# Patient Record
(demographics unavailable — no encounter records)

---

## 2017-01-22 NOTE — ER DOCUMENT REPORT
ED Medical Screen (RME)





- General


Stated Complaint: HIP PAIN


Mode of Arrival: Ambulatory


Information source: Patient


Notes: 


60 y/o F presents to ED c/o right hip and left knee pain. Reports hx of similar 

symptoms in the past. Denies fever or recent injury. 





I have greeted and performed a rapid initial assessment of this patient. A 

comprehensive ED assessment and evaluation of the patient, analysis of test 

results and completion of the medical decision making process will be conducted 

by additional ED providers.


TRAVEL OUTSIDE OF THE [place] IN LAST 30 DAYS: No





- Related Data


Allergies/Adverse Reactions: 


 





acetaminophen [From Percocet] Allergy (Severe, Verified 09/18/16 11:44)


 itching


oxycodone HCl [From Percocet] Allergy (Severe, Verified 09/18/16 11:44)


 itching


Penicillins Allergy (Severe, Verified 09/18/16 11:44)


 Generalized Itching


orange juice Allergy (Severe, Uncoded 09/18/16 11:44)


 Generalized Itching











Past Medical History





- Past Medical History


Cardiac Medical History: Reports: Hx Hypertension


Pulmonary Medical History: Reports: Hx Asthma


Musculoskeltal Medical History: Reports Hx Arthritis


Past Surgical History: Reports: Hx Oral Surgery, Hx Tubal Ligation





- Immunizations


Hx Diphtheria, Pertussis, Tetanus Vaccination: Yes





Physical Exam





- Vital signs


Vitals: 





 











Temp Pulse Resp BP Pulse Ox


 


 98.6 F   93   21 H  109/61   97 


 


 01/22/17 12:00  01/22/17 12:00  01/22/17 12:00  01/22/17 12:00  01/22/17 12:00














- General


General appearance: Appears well, Alert


In distress: None





Course





- Vital Signs


Vital signs: 





 











Temp Pulse Resp BP Pulse Ox


 


 98.6 F   93   21 H  109/61   97 


 


 01/22/17 12:00  01/22/17 12:00  01/22/17 12:00  01/22/17 12:00  01/22/17 12:00

## 2017-01-22 NOTE — ER DOCUMENT REPORT
ED Extremity Problem, Lower





- General


Chief Complaint: Knee Pain


Stated Complaint: HIP PAIN


Time seen by provider: 12:39


Mode of Arrival: Ambulatory


Information source: Patient


Notes: 


59-year-old female presents to ED for complain of pain in her right hip and 

left knee that is chronic but is worse since yesterday.  She states she has not 

had any injuries.  She states that her doctor at [facility] told her that 

she needed a hip and knee replacement and they have not been scheduled yet.  

Today this morning she woke up with a headache has a history of high blood 

pressure but did not take her blood pressure medicine this morning and her 

blood pressure is 109/61


TRAVEL OUTSIDE OF THE [place] IN LAST 30 DAYS: No





- HPI


Patient complains to provider of: Pain, Swelling


Location: Hip - And headache, Knee


Occurred: This morning - Headache started this morning hip and knee pain are 

chronic


Onset/Duration: Persistent


Quality of pain: Achy - Headache, Sharp - Knee and hip


Severity: Severe


Pain Level: 5


Recent injury: No


Associated symptoms: Painful ambulation


Exacerbated by: Movement, Walking


Relieved by: Nothing





- Related Data


Allergies/Adverse Reactions: 


 





Penicillins Allergy (Severe, Verified 09/18/16 11:44)


 Generalized Itching


orange juice Allergy (Severe, Uncoded 09/18/16 11:44)


 Generalized Itching











Past Medical History





- General


Information source: Patient





- Social History


Smoking Status: Current Every Day Smoker


Cigarette use (# per day): Yes - 2 cigarettes a day


Chew tobacco use (# tins/day): No


Smoking Education Provided: Yes - less than 1 minute


Frequency of alcohol use: None


Drug Abuse: None


Occupation: none


Lives with: Family


Family History: Arthritis, CAD, DM, Hyperlipidemia, Hypertension


Patient has suicidal ideation: No


Patient has homicidal ideation: No





- Past Medical History


Cardiac Medical History: Reports: Hx Hypertension


Pulmonary Medical History: Reports: Hx Asthma, Hx Bronchitis


EENT Medical History: Reports: None


Neurological Medical History: Reports: None


Endocrine Medical History: Reports: None


Renal/ Medical History: Reports: None


Malignancy Medical History: Reports: None


GI Medical History: Reports: None


Musculoskeltal Medical History: Reports Hx Arthritis, Reports Hx 

Musculoskeletal Deformity - States she has been told she needs knee and hip 

replacement, Reports Hx Musculoskeletal Trauma


Skin Medical History: Reports None


Psychiatric Medical History: Reports: None


Traumatic Medical History: Reports: Hx Fractures - Multiple toes on the left 

foot


Infectious Medical History: Reports: None


Past Surgical History: Reports: Hx Oral Surgery - Teeth removed surgically, Hx 

Tubal Ligation





- Immunizations


Hx Diphtheria, Pertussis, Tetanus Vaccination: Yes


Hx Pneumococcal Vaccination: 01/01/14





Review of Systems





- Review of Systems


Constitutional: No symptoms reported


EENT: Nose discharge, Sinus pressure


Cardiovascular: No symptoms reported


Respiratory: No symptoms reported


Gastrointestinal: No symptoms reported


Genitourinary: No symptoms reported


Female Genitourinary: No symptoms reported


Musculoskeletal: Other - Right hip left knee pain chronic worse since last night


Skin: No symptoms reported


Hematologic/Lymphatic: No symptoms reported


Neurological/Psychological: Headaches


-: Yes All other systems reviewed and negative





Physical Exam





- Vital signs


Vitals: 


 











Temp Pulse Resp BP Pulse Ox


 


 98.6 F   93   21 H  109/61   97 


 


 01/22/17 12:00  01/22/17 12:00  01/22/17 12:00  01/22/17 12:00  01/22/17 12:00











Interpretation: Normal





- General


General appearance: Appears well, Alert





- HEENT


Head: Normocephalic, Atraumatic


Eyes: Normal


Pupils: PERRL


Visual fields normal: Yes


Ears: Normal


External canal: Normal


Tympanic membrane: Normal


Sinus: Normal


Nasal: Purulent discharge, Swelling


Mouth/Lips: Normal


Mucous membranes: Normal


Pharynx: Normal


Neck: Normal





- Respiratory


Respiratory status: No respiratory distress


Chest status: Nontender


Breath sounds: Nonproductive cough


Chest palpation: Normal





- Cardiovascular


Rhythm: Regular


Heart sounds: Normal auscultation


Murmur: No





- Abdominal


Inspection: Normal


Distension: No distension


Bowel sounds: Normal


Tenderness: Nontender


Organomegaly: No organomegaly





- Back


Back: Normal, Nontender





- Extremities


General upper extremity: Normal inspection, Nontender, Normal color, Normal ROM

, Normal temperature


General lower extremity: Normal inspection, Nontender, Normal color, Normal ROM

, Normal temperature, Normal weight bearing.  No: Alfredo's sign





- Neurological


Neuro grossly intact: Yes


Cognition: Normal


Orientation: AAOx4


[place] Coma Scale Eye Opening: Spontaneous


[place] Coma Scale Verbal: Oriented


[place] Coma Scale Motor: Obeys Commands


[place] Coma Scale Total: 15


Speech: Normal


Cranial nerves: Normal


Cerebellar coordination: Normal


Motor strength normal: LUE, RUE, LLE, RLE


Additional motor exam normals: Equal 


Babinski reflex: Normal (flexor plantar)


Sensory: Normal


Biceps - Reflex grade: 2 = Normal


Triceps - Reflex grade: 2 = Normal


Brachioradialis - Reflex grade: 2 = Normal


Knee - Reflex grade: 2 = Normal


Ankle - Reflex grade: 2 = Normal





- Psychological


Associated symptoms: Normal affect, Normal mood





- Skin


Skin Temperature: Warm


Skin Moisture: Dry


Skin Color: Normal





Course





- Re-evaluation


Re-evalutation: 





01/22/17 14:01


Patient states her head ache is relieved feeling much better since pain in her 

hip and knee of both decreased and she is ready to go back home.  We'll 

discharge home with a prescription for naproxen which is what she was medicated 

with in the emergency room





- Vital Signs


Vital signs: 


 











Temp Pulse Resp BP Pulse Ox


 


 98.6 F   93   21 H  109/61   97 


 


 01/22/17 12:00  01/22/17 12:00  01/22/17 12:00  01/22/17 12:00  01/22/17 12:00














Discharge





- Discharge


Clinical Impression: 


 Chronic right hip pain, Chronic pain of left knee





Headache


Qualifiers:


 Headache type: unspecified Headache chronicity pattern: acute headache 

Intractability: not intractable Qualified Code(s): R51 - Headache





Condition: Stable


Disposition: HOME, SELF-CARE


Additional Instructions: 


HEADACHE:





     The physician does not feel that the headache you are experiencing has a 

serious underlying cause.  Most headaches are due to emotional stress, with 

resultant muscle tension (tension headache). Occasionally, headaches are 

secondary to changes in the blood vessels of the scalp (vascular headache and 

migraine headache).  Sometimes, a headache is the first symptom of another 

developing illness, such as a viral infection.  You have no evidence of stroke, 

bleeding, meningitis, or other serious cause of your headache.


     The treatment of headaches varies with the severity and cause of the pain.

  Not all headaches need pain shots.  In fact, there is evidence that using 

narcotics for headaches may make them worse in the long run.  The physician 

will determine the therapy that's in your best interest.


     If you develop a fever, if the headache is different from any you've 

previously experienced, or if the headache progressively worsens, then call 

your physician at once or go to the emergency room.





Chronic Pain Control





     Stress, inactivity, and depression make pain more severe regardless of the 

cause of the pain.  Stress and poor physical condition can cause pain such as 

headaches and backache.


     Relaxation:  Rest in a quiet place with your eyes closed for 20 minutes 

twice daily.  Concentrate on a pleasant image, or simply "feel" your breathing.

  Clear your mind.


     Stress management:  Deal with your "stressors."  Either take action, or 

eliminate the stressor from your life.  Don't let things hang over you.  Accept 

those things you can't change.


     Nutrition:  Eat small, balanced meals -- don't skip, don't overeat.  Meals 

should be high-carbohydrate, low-sugar, low-fat.


     Exercise:  Exercise helps painful conditions and eases stress. Get 30 

minutes of moderate exercise, five days a week. Do an activity that does not 

flare your pain.


     Precautions:  Pain which continues to disrupt daily activities, or which 

changes in nature, requires a medical evaluation.  Pain Clinic referral is 

available.





     


We do not manage chronic pain in the Emergency Department.  We will try to 

appropriately help you through an acute flare of your chronic painful condition

, but for on-going chronic pain that does not improve, you will need to see 

your private doctor or pain management specialist.  We do not provide repeated 

medication management of chronic painful conditions.  If you wish, we can 

provide the name of local pain management physicians.





Anti-Inflammatory Medication





     You have received a prescription for an antiinflammatory agent. This is an 

excellent, safe drug for pain control.  In addition, it has potent 

antiinflammatory effects which are beneficial, especially in the treatment of 

injuries, arthritis, or tendonitis.


     It's best to take this medicine with food.  Persons with ulcer disease or 

allergy to aspirin should notify their physician of this before taking this 

drug.


     Take the medication exactly as prescribed.  Don't take additional doses 

unless instructed to do so by your doctor.  If you develop wheezing, shortness 

of breath, hives, faintness, stomach pain, vomiting, or dark black stools, 

return for re-evaluation at once.








FOLLOW-UP CARE:


If you have been referred to a physician for follow-up care, call the physician

s office for an appointment as you were instructed or within the next two days.

  If you experience worsening or a significant change in your symptoms, notify 

the physician immediately or return to the Emergency Department at any time for 

re-evaluation.





Please follow-up with your primary doctor and with your doctor taking care of 

your hip and knee.


Prescriptions: 


Naproxen 500 mg PO BIDP PRN #20 tablet


 PRN Reason:

## 2017-05-20 NOTE — ER DOCUMENT REPORT
ED General





- General


Chief Complaint: Cough


Stated Complaint: HEADACHE/NAUSEA


Time Seen by Provider: 05/20/17 19:20


Notes: 


The patient is a 60-year-old female, PMHx seasonal allergies, HTN, who presents 

with a dry cough, stuffy nose and cough over the past 2 days.  She also has a 

dull frontal headache during the same time. She tried her Flonase without much 

relief of her symptoms.  She stopped smoking for the past 3 days, but her 

coughing increased.  She denies chest pain, shortness of breath, fevers, neck 

stiffness, blurry vision, numbness, tingling, ataxia or rash.


TRAVEL OUTSIDE OF THE [place] IN LAST 30 DAYS: No





- Related Data


Allergies/Adverse Reactions: 


 





Penicillins Allergy (Severe, Verified 05/20/17 18:43)


 Generalized Itching


orange juice Allergy (Severe, Uncoded 05/20/17 18:43)


 Generalized Itching











Past Medical History





- General


Information source: Patient





- Social History


Smoking Status: Current Every Day Smoker


Family History: Arthritis, CAD, DM, Hyperlipidemia, Hypertension


Patient has suicidal ideation: No


Patient has homicidal ideation: No





- Past Medical History


Cardiac Medical History: Reports: Hx Hypertension


Pulmonary Medical History: Reports: Hx Asthma, Hx Bronchitis


Renal/ Medical History: Denies: Hx Peritoneal Dialysis


Musculoskeltal Medical History: Reports Hx Arthritis, Reports Hx 

Musculoskeletal Deformity - States she has been told she needs knee and hip 

replacement, Reports Hx Musculoskeletal Trauma


Traumatic Medical History: Reports: Hx Fractures - Multiple toes on the left 

foot


Past Surgical History: Reports: Hx Oral Surgery - Teeth removed surgically, Hx 

Tubal Ligation





- Immunizations


Hx Diphtheria, Pertussis, Tetanus Vaccination: Yes


Hx Pneumococcal Vaccination: 01/01/14





Review of Systems





- Review of Systems


Notes: 


REVIEW OF SYSTEMS:


CONSTITUTIONAL: -fevers, -chills


EENT: -eye pain, -difficulty swallowing, +nasal congestion


CARDIOVASCULAR:-chest pain, -syncope.


RESPIRATORY: +cough, -SOB


GASTROINTESTINAL: -abdominal pain, - nausea, -vomiting, -diarrhea


GENITOURINARY: -dysuria, -hematuria


MUSCULOSKELETAL: -back pain, -neck pain


SKIN: -rash or skin lesions.


HEMATOLOGIC: -easy bruising or bleeding.


LYMPHATIC: -swollen, enlarged glands.


NEUROLOGICAL: -altered mental status or loss of consciousness, +headache, -

neurologic symptoms


PSYCHIATRIC: -anxiety, -depression.


ALL OTHER SYSTEMS REVIEWED AND NEGATIVE.





Physical Exam





- Vital signs


Vitals: 


 











Temp Pulse Resp BP Pulse Ox


 


 98.3 F   95   20   123/69   95 


 


 05/20/17 18:44  05/20/17 18:44  05/20/17 18:44  05/20/17 18:44  05/20/17 18:44














- Notes


Notes: 


PHYSICAL EXAMINATION:





GENERAL: Well-appearing, well-nourished and in no acute distress.





HEAD: Atraumatic, normocephalic.





EYES: Pupils equal round and reactive to light, extraocular movements intact, 

sclera anicteric, conjunctiva are normal.





ENT: swollen nasal turbinates, posterior cobblestoning, frontal sinus tenderness

, nares patent, oropharynx clear without exudates.  Moist mucous membranes.





NECK: Normal range of motion, supple without lymphadenopathy





LUNGS: Breath sounds clear to auscultation bilaterally and equal.  No wheezes 

rales or rhonchi.





HEART: Regular rate and rhythm without murmurs





ABDOMEN: Soft, nontender, normoactive bowel sounds.  No guarding, no rebound.  

No masses appreciated.





EXTREMITIES: Normal range of motion, no pitting or edema.  No cyanosis.





NEUROLOGICAL: Cranial nerves grossly intact.  Normal speech, normal gait.  

Normal sensory, motor, and reflex exams.





PSYCH: Normal mood, normal affect.





SKIN: Warm, Dry, normal turgor, no rashes or lesions noted.





Course





- Re-evaluation


Re-evalutation: 


Patient appears well and has no evidence of pneumonia on chest x-ray or 

physical exam.  Instructed her about continuing Zyrtec, Flonase and sinus 

rinses for her sinus congestion and sinus headache. Also provided smoking 

cessation counseling.  She told the triage nurse that she had nausea, but 

denies any nausea or vomiting to me.





- Vital Signs


Vital signs: 


 











Temp Pulse Resp BP Pulse Ox


 


 98.3 F   81   18   133/80 H  95 


 


 05/20/17 18:44  05/20/17 20:08  05/20/17 20:08  05/20/17 20:08  05/20/17 20:08














Discharge





- Discharge


Clinical Impression: 


 Sinus congestion





Allergic rhinitis


Qualifiers:


 Allergic rhinitis trigger: unspecified Allergic rhinitis seasonality: seasonal 

Qualified Code(s): J30.2 - Other seasonal allergic rhinitis





Condition: Good


Disposition: HOME, SELF-CARE


Additional Instructions: 


Sinus congestion most likely caused by seasonal allergies.  Continue her 

Flonase and add Zyrtec and a sinus rinse to help with your symptoms.  Try to 

continue to stop smoking.





UPPER RESPIRATORY ILLNESS:





     You have a viral infection of the respiratory passages -- a "cold."  This 

common infection causes nasal congestion, drainage, and often sore throat and 

cough.  It is highly contagious.  The disease usually lasts about 10 to 14 days.


     There is no "cure" for the viral infection -- it must run its course.  If 

there is a complication, such as bacterial infection in the nose, sinuses, 

middle ear, or bronchial tubes, antibiotics may be required.  The antibiotics 

won't affect the virus.


     Drink plenty of fluids.  A humidifier may help.  An expectorant medication 

or decongestant may make you more comfortable.  Use acetaminophen or ibuprofen 

for fever or aches.


     See the doctor if fever persists over two days, if there is any 

significant worsening of your symptoms, or if you simply fail to improve as 

expected.





SMOKING:


     If you smoke, you should stop smoking.  The tar and chemicals in cigarette 

smoke are harmful.  Smoking has been shown to cause:


          emphysema


          chronic bronchitis


          lung cancer


          mouth and throat cancer


          stomach and pancreas cancer


          premature aging


          birth defects


     In addition, smoking increases ear and lung infections in children of 

smokers.








FOLLOW-UP CARE:


If you have been referred to a physician for follow-up care, call the physician

s office for an appointment as you were instructed or within the next two days.

  If you experience worsening or a significant change in your symptoms, notify 

the physician immediately or return to the Emergency Department at any time for 

re-evaluation.





Sinusitis





     You have sinusitis, an infection of the sinus cavities of the face.  The 

sinuses are air-filled chambers which open into the inside of the nose.  

Bacteria and pus fill a sinus, causing pain, drainage, and fever.


     Healing requires seven to 10 days.


     Avoid chemical fumes, pollens, dusts, and smoke (especially cigarette smoke

).  Keep the air humidified in your bedroom and work area and take plenty of 

liquids by mouth.


     This condition can be serious if the infection spreads.  If your symptoms 

worsen, or if you develop severe headache, high fever, stiff neck, or a rash, 

you must call the doctor or return for re-evaluation.





Referrals: 


RITA DIEHL MD [Primary Care Provider] - Follow up as needed

## 2017-09-23 NOTE — RADIOLOGY REPORT (SQ)
EXAM DESCRIPTION:  HIP RIGHT AP/LATERAL



COMPLETED DATE/TIME:  9/23/2017 8:23 pm



REASON FOR STUDY:  pain, denies fall/trauma



COMPARISON:  None.



NUMBER OF VIEWS:  Two views.



TECHNIQUE:  AP pelvis and additional frog-leg view of the right hip.



LIMITATIONS:  None.



FINDINGS:  MINERALIZATION: Normal.

RIGHT HIP: No fracture or dislocation.  No worrisome bone lesions.

LEFT HIP: No fracture or dislocation.  No worrisome bone lesions.

PUBIS AND ISCHIUM: No fracture.

PELVIS: No fracture.

SACRUM: No fracture or dislocation. No worrisome bone lesions.

LOWER LUMBAR SPINE: No fracture or dislocation. No worrisome bone lesions.  No significant disc disea
se.

SOFT TISSUES: No findings.

OTHER: No other significant finding.



IMPRESSION:  NEGATIVE STUDY OF THE RIGHT HIP. NO RADIOGRAPHIC EVIDENCE OF ACUTE INJURY.



TECHNICAL DOCUMENTATION:  JOB ID:  [id number]

 2011 [employer]- All Rights Reserved

## 2017-09-23 NOTE — ER DOCUMENT REPORT
ED Hip Pain/Injury





- General


Chief Complaint: Hip Pain


Stated Complaint: RIGHT HIP PAIN


Time Seen by Provider: 09/23/17 19:41


TRAVEL OUTSIDE OF THE [place] IN LAST 30 DAYS: No





- HPI


Patient complains to provider of: Pain, Hip - right


Occurred: Other - 3 days


Onset/Duration: Gradual


Quality of pain: Achy


Severity: Moderate


Context: No trauma


Skin Color: Normal


Skin Temperature: Warm


Rotation of extremity: None


Pain with palpation of the pelvis: No


Associated Symptoms: None


Other injuries: denies: Abdomen, Back, Chest, Face, Head, Neck, LUE, LLE, RUE, 

RLE





- Related Data


Allergies/Adverse Reactions: 


 





Penicillins Allergy (Severe, Verified 09/23/17 19:27)


 Generalized Itching


orange juice Allergy (Severe, Uncoded 05/20/17 18:43)


 Generalized Itching











Past Medical History





- Social History


Smoking Status: Current Every Day Smoker


Family History: Arthritis, CAD, DM, Hyperlipidemia, Hypertension


Patient has suicidal ideation: No


Patient has homicidal ideation: No





- Past Medical History


Cardiac Medical History: Reports: Hx Hypertension


Pulmonary Medical History: Reports: Hx Asthma, Hx Bronchitis


Renal/ Medical History: Denies: Hx Peritoneal Dialysis


Musculoskeltal Medical History: Reports Hx Arthritis, Reports Hx 

Musculoskeletal Deformity - States she has been told she needs knee and hip 

replacement, Reports Hx Musculoskeletal Trauma


Traumatic Medical History: Reports: Hx Fractures - Multiple toes on the left 

foot


Past Surgical History: Reports: Hx Oral Surgery - Teeth removed surgically, Hx 

Tubal Ligation





- Immunizations


Hx Diphtheria, Pertussis, Tetanus Vaccination: Yes


Hx Pneumococcal Vaccination: 01/01/14





Review of Systems





- Review of Systems


Constitutional: No symptoms reported


Musculoskeletal: See HPI


-: Yes All other systems reviewed and negative





Physical Exam





- Vital signs


Vitals: 


 











Temp Pulse Resp BP Pulse Ox


 


 98.9 F   83   18   129/70 H  94 


 


 09/23/17 19:27  09/23/17 19:27  09/23/17 19:27  09/23/17 19:27  09/23/17 19:27














- General


General appearance: Appears well, Alert


In distress: None





- Cardiovascular


Pulses: Normal: Femoral, Dorsalis pedis


Normal capillary refill: Yes





- Extremities


General lower extremity: Normal inspection, Nontender, Normal color, Normal ROM

, Normal strength, Normal temperature, Normal weight bearing





- Neurological


Neuro grossly intact: Yes


Cognition: Normal


Orientation: AAOx4


[place] Coma Scale Eye Opening: Spontaneous


[place] Coma Scale Verbal: Oriented


[place] Coma Scale Motor: Obeys Commands


[place] Coma Scale Total: 15


Motor strength normal: LLE, RLE


Additional motor exam normals: No: Weakness


Sensory: Normal





- Skin


Skin Temperature: Warm


Skin Moisture: Dry


Skin Color: Normal


Skin Turgor: Elastic





Course





- Re-evaluation


Re-evalutation: 





09/23/17 20:58


No evidence of a septic joint, gout flare, dislocation, or fracture on exam and 

imaging. Vitals wnl. At this time, I do not see an indication for labs or 

further imaging. Will discharge with conservative measures, return precautions, 

and follow-up recommendations.








- Vital Signs


Vital signs: 


 











Temp Pulse Resp BP Pulse Ox


 


 98.9 F   83   18   129/70 H  94 


 


 09/23/17 19:27  09/23/17 19:27  09/23/17 19:27  09/23/17 19:27  09/23/17 19:27














Discharge





- Discharge


Clinical Impression: 


 Generalized osteoarthrosis, involving multiple sites





Condition: Good


Disposition: HOME, SELF-CARE


Instructions:  Arthritis (Formerly [facility])


Referrals: 


RITA DIEHL MD [Primary Care Provider] - Follow up as needed

## 2018-11-29 NOTE — RADIOLOGY REPORT (SQ)
EXAM DESCRIPTION:  MRI LUMBAR SPINE WITHOUT



COMPLETED DATE/TIME:  11/29/2018 9:15 am



REASON FOR STUDY:  OTHER INTERVERTEBRAL DISC DEGENERATION, LUMBOSACRAL REGION (M51.37) M51.37  OTHER 
INTERVERTEBRAL DISC DEGENERATION, LUMBOSACRAL R



COMPARISON:  Lumbar spine plain films 4/8/2013



TECHNIQUE:  Sagittal and Axial imaging includes T1, T2, STIR and gradient echo sequences. Coronal T2/
HASTE imaging.



LIMITATIONS:  None.



FINDINGS:  VISUALIZED UPPER ABDOMEN:  Limited evaluation. No acute or suspicious findings suggested.

SEGMENTATION: No transitional anatomy. The lowest well-developed disc space is labeled L5-S1.

ALIGNMENT: Anatomic.

VERTEBRAE: Intact.

BONE MARROW: Normal. No marrow replacement or reactive changes.

DISC SIGNAL: Normal. No significant abnormal signal or loss of height.

POSTERIOR ELEMENTS:  Generally intact.  No pars defect evident.

HARDWARE: None in the spine.

CORD AND CONUS: Normal in size and signal intensity. Conus at the appropriate level.

SOFT TISSUES: No aortic aneurysm seen. No bulky retroperitoneal adenopathy or mass. No paraspinal mas
s or fluid.

L1-L2: No significant spinal stenosis or exit foraminal stenosis.

L2-L3: No significant spinal stenosis or exit foraminal stenosis.

L3-L4: No significant spinal stenosis or exit foraminal stenosis.

L4-L5: Broad diffuse posterior disc bulge bony spurring is present with moderate bilateral facet and 
ligament hypertrophy.  Mild central canal narrowing.  Mild bilateral inferior foraminal narrowing wit
hout exit L4 nerve root impingement.

L5-S1: No significant spinal stenosis or exit foraminal stenosis.

LOWER THORACIC: Incompletely imaged.  No stenosis seen.

SACRUM: Visualized upper sacrum intact.

OTHER: No other significant findings.



IMPRESSION:  Mild degenerative disc changes at L4-5



TECHNICAL DOCUMENTATION:  JOB ID:  [id number]

 2011 [employer]- All Rights Reserved



Reading location - IP/workstation name: [facility]